# Patient Record
Sex: MALE | Race: WHITE
[De-identification: names, ages, dates, MRNs, and addresses within clinical notes are randomized per-mention and may not be internally consistent; named-entity substitution may affect disease eponyms.]

---

## 2022-06-08 ENCOUNTER — HOSPITAL ENCOUNTER (OUTPATIENT)
Dept: HOSPITAL 95 - MHTC | Age: 82
Setting detail: OBSERVATION
LOS: 1 days | Discharge: HOME | End: 2022-06-09
Attending: INTERNAL MEDICINE | Admitting: INTERNAL MEDICINE
Payer: OTHER GOVERNMENT

## 2022-06-08 VITALS — HEIGHT: 68 IN | WEIGHT: 149.03 LBS | BODY MASS INDEX: 22.59 KG/M2

## 2022-06-08 DIAGNOSIS — I25.110: Primary | ICD-10-CM

## 2022-06-08 DIAGNOSIS — K21.9: ICD-10-CM

## 2022-06-08 DIAGNOSIS — I10: ICD-10-CM

## 2022-06-08 DIAGNOSIS — Z95.1: ICD-10-CM

## 2022-06-08 DIAGNOSIS — Z20.822: ICD-10-CM

## 2022-06-08 DIAGNOSIS — T82.897A: ICD-10-CM

## 2022-06-08 DIAGNOSIS — Z79.899: ICD-10-CM

## 2022-06-08 DIAGNOSIS — E78.5: ICD-10-CM

## 2022-06-08 DIAGNOSIS — E11.9: ICD-10-CM

## 2022-06-08 PROCEDURE — C9600 PERC DRUG-EL COR STENT SING: HCPCS

## 2022-06-08 PROCEDURE — C1761: HCPCS

## 2022-06-08 PROCEDURE — C1725 CATH, TRANSLUMIN NON-LASER: HCPCS

## 2022-06-08 PROCEDURE — G0378 HOSPITAL OBSERVATION PER HR: HCPCS

## 2022-06-08 PROCEDURE — A9270 NON-COVERED ITEM OR SERVICE: HCPCS

## 2022-06-08 PROCEDURE — C1874 STENT, COATED/COV W/DEL SYS: HCPCS

## 2022-06-08 PROCEDURE — C1887 CATHETER, GUIDING: HCPCS

## 2022-06-08 PROCEDURE — C1876 STENT, NON-COA/NON-COV W/DEL: HCPCS

## 2022-06-08 PROCEDURE — C1769 GUIDE WIRE: HCPCS

## 2022-06-08 NOTE — NUR
END OF SHIFT NOTE
PATIENT HAS HAD GOOD DAY. HIS SHEATH WAS PULLED AT 1342 AND THE RIGHT GROIN
SITE HAS BEEN STABLE ALL DAY. NO ACUTE CHANGES SINCE. HE IS SITTING IN BED AT
50 DEGREES AND ATE DINNER WITHOUT DIFFICULTY. HIS PULSE IS STILL IN THE 50'S
BUT HE TJ CHEST PAIN. BP HAS BEEN WITHIN PARAMETERS. NO ACUTE CHANGES.
WILL GIVE REPORT TO NEXT SHIFT TO RESUME CARE.

## 2022-06-08 NOTE — NUR
ASSUMPTION OF CARE
PT RESTING IN BED WATCHING TV. HE IS ALERT AND ORIENTED, PLEASANT AND
PARTICIPATES IN CONVERSATION. R GROIN SITE VISUALIZED WITH DAY SHIFT RN. SITE
HAS TRANSPARENT DRESSING, NO BLEEDING, BRUISING OR TENDERNESS SURROUNDING
SITE. HE DENIES CHEST PAIN/DISCOMFORT AND SOB. VSS. SEE SHIFT ASSESSMENT.

## 2022-06-08 NOTE — NUR
0915 BACK FROM CATH LAB
PATIENT IS ALERT AND ORIENTED. HE IS LYING FLAT ON HIS BACK. ASSUMED CARE OF
PATIENT FROM CATH LAB STAFF. PATIENT IS BRADYCARDIC IN THE 50'S. HE HAS A
SHEATH TO HIS RIGHT FEMORAL GROIN WITH PRESSURE FLUID ATTATCHED TO SITE.
PATIENT HAS DOPPLER PULSES TO LEFT FOOT AND TO RIGHT PT BUT NOT TO PEDAL ON
RIGHT, DR IS AWAIR THIS IS HIS BASELINE. FAMILY AT BEDSIDE. PATIENT HAS TKO TO
RIGHT AC IV. LAB ORDER PUT IN FOR 1100 FOR PT. RIGHT GROIN IS SOFT TO TOUCH
WITH NO ABNORMALITIES OR HEMATOMA'S. WILL CONTINUE TO MONITOR SITE AND VS AND
PATIENT. AWAITING FURTHER ORDERS FROM DR PRICE.

## 2022-06-08 NOTE — NUR
[id number]...SHEATH PULLED
HELD 20 MIN OF MANUAL
PRESSURE TO RIGHT GROIN ONCE SHEATH WAS PULLED OUT. NO HEMATOMA OR
BRUISING PRESENT S/P.
PATIENT TOLERATED IT WELL HOWEVER HIS BP DID COME UP A BIT WHILE PRESSURE
BEING HELD BUT THE BP DID GO BACK TO BASELINE ONCE NO PAINFUL STIMULI PRESENT.
PULSE CONTINUES TO STAY IN LOW 50'S, 1ST DEGREE AV BLOCK.

## 2022-06-09 LAB
ANION GAP SERPL CALCULATED.4IONS-SCNC: 7 MMOL/L (ref 6–16)
BUN SERPL-MCNC: 39 MG/DL (ref 8–24)
CALCIUM SERPL-MCNC: 9.5 MG/DL (ref 8.5–10.1)
CHLORIDE SERPL-SCNC: 107 MMOL/L (ref 98–108)
CO2 SERPL-SCNC: 25 MMOL/L (ref 21–32)
CREAT SERPL-MCNC: 1.66 MG/DL (ref 0.6–1.2)
DEPRECATED RDW RBC AUTO: 45.1 FL (ref 35.1–46.3)
ERYTHROCYTE [DISTWIDTH] IN BLOOD BY AUTOMATED COUNT: 14.4 % (ref 11.7–14.2)
GLUCOSE SERPL-MCNC: 121 MG/DL (ref 70–99)
HCT VFR BLD AUTO: 43.9 % (ref 37–53)
HGB BLD-MCNC: 13.8 G/DL (ref 13.5–17.5)
MCHC RBC AUTO-ENTMCNC: 31.4 G/DL (ref 31.5–36.5)
MCV RBC AUTO: 87 FL (ref 80–100)
NRBC # BLD AUTO: 0 K/MM3 (ref 0–0.02)
NRBC BLD AUTO-RTO: 0 /100 WBC (ref 0–0.2)
PLATELET # BLD AUTO: 219 K/MM3 (ref 150–400)
POTASSIUM SERPL-SCNC: 5.2 MMOL/L (ref 3.5–5.5)
SODIUM SERPL-SCNC: 139 MMOL/L (ref 136–145)

## 2022-06-09 NOTE — NUR
UPDATE
PT WAKENS TO VERBAL STIMULI. HE REMAINS ALERT AND ORIENTED. HE DENIES CHEST
PAIN/DISCOMFORT AND SOB. HR REMAINS IN 50S, 1ST DEGREE AV BLOCK. R GROIN SITE
IS UNCHANGED. DRESSING IN PLACE, NO BLEEDING, NO HEMATOMA. VSS, PT DENIES
NEEDS AT THIS TIME. CALL LIGHT WITHIN REACH.

## 2022-06-09 NOTE — NUR
Assumed care of pt at 0700. Report recevied from Tianna SMITH. Pt A&O x 4. Answers
questions. Follows commands. Verbalizes needs. Pleasant and cooperative with
care. On room air. SR per monitor, rate 80. BP stable. Right groin site from
angiogram. Color, sensastion, pulses, capillary refill equal BLE and BUE.
Groin site dressed with tegaderm. Free of bruising, drainage, hematoma.

## 2022-06-09 NOTE — NUR
SHIFT SUMMARY
PT SLEPT THROUGH MOST OF NIGHT. HE IS ALERT AND ORIENTED. HE HAS DENIED CHEST
PAIN/PRESSURE, PALPITATIONS, AND SOB THROUGHOUT SHIFT. R GROIN SITE HAS
TRANSPARENT DRESSING IN PLACE. DRESSING IS CLEAN, SURROUNDING TISSUE IS SOFT
AND NONTENDER. VS HAVE REMAINED STABLE THROUGHOUT NIGHT. URINE OUTPUT 700ML.
PT VERBALIZES LOOKING FORWARD TO GOING HOME. PLAN IN PLACE FOR DAUGHTER TO
PICK PT UP UPON DISCHARGE. PT WANTS TO DISCUSS HOME MEDICATIONS WITH PROVIDER
BEFORE DISCHARGE. WILL REPORT TO ONCOMING RN.

## 2022-06-09 NOTE — NUR
Pt discharged from unit at 1027. Escorted to entrance via wheelchair
accompanied by PCT Tika and daughter, Shavonne. Education given to pt and
daughter. Received education from this RN and from Dr Madison. Provided with
written prescription for plavix to ensure pt is medicated while he awaits
pres[facility]. IV access discontinued.

## 2022-12-06 ENCOUNTER — HOSPITAL ENCOUNTER (EMERGENCY)
Dept: HOSPITAL 95 - ER | Age: 82
Discharge: HOME | End: 2022-12-06
Payer: OTHER GOVERNMENT

## 2022-12-06 VITALS — WEIGHT: 184.99 LBS | HEIGHT: 69 IN | BODY MASS INDEX: 27.4 KG/M2

## 2022-12-06 DIAGNOSIS — Z79.899: ICD-10-CM

## 2022-12-06 DIAGNOSIS — Z79.82: ICD-10-CM

## 2022-12-06 DIAGNOSIS — I46.9: Primary | ICD-10-CM

## 2022-12-06 LAB
CA-I BLD-SCNC: 1.42 MMOL/L (ref 1.1–1.46)
CHLORIDE BLD-SCNC: 108 MMOL/L (ref 98–108)
CO2 BLD-SCNC: 24 MMOL/L (ref 21–32)
CREAT BLD-MCNC: 1.8 MG/DL (ref 0.8–1.3)
GLUCOSE BLDC GLUCOMTR-MCNC: 260 MG/DL (ref 70–99)
HCT VFR BLD CALC: 42 % (ref 41–53)
HGB BLD CALC-MCNC: 14.3 G/DL (ref 13.5–17.5)
POTASSIUM BLD-SCNC: 5.4 MMOL/L (ref 3.5–5.5)
SODIUM BLD-SCNC: 140 MMOL/L (ref 135–148)